# Patient Record
Sex: MALE | Race: WHITE | HISPANIC OR LATINO | URBAN - METROPOLITAN AREA
[De-identification: names, ages, dates, MRNs, and addresses within clinical notes are randomized per-mention and may not be internally consistent; named-entity substitution may affect disease eponyms.]

---

## 2018-01-31 ENCOUNTER — EMERGENCY (EMERGENCY)
Facility: HOSPITAL | Age: 53
LOS: 1 days | Discharge: HOME | End: 2018-01-31
Attending: EMERGENCY MEDICINE | Admitting: EMERGENCY MEDICINE
Payer: SELF-PAY

## 2018-01-31 VITALS
RESPIRATION RATE: 20 BRPM | HEIGHT: 65 IN | WEIGHT: 190.04 LBS | SYSTOLIC BLOOD PRESSURE: 120 MMHG | OXYGEN SATURATION: 98 % | DIASTOLIC BLOOD PRESSURE: 79 MMHG | TEMPERATURE: 98 F | HEART RATE: 86 BPM

## 2018-01-31 PROCEDURE — 99284 EMERGENCY DEPT VISIT MOD MDM: CPT

## 2018-01-31 PROCEDURE — 73562 X-RAY EXAM OF KNEE 3: CPT | Mod: 26,50

## 2018-01-31 PROCEDURE — 99284 EMERGENCY DEPT VISIT MOD MDM: CPT | Mod: 25

## 2018-01-31 PROCEDURE — 73562 X-RAY EXAM OF KNEE 3: CPT

## 2018-01-31 NOTE — ED ADULT TRIAGE NOTE - CHIEF COMPLAINT QUOTE
pt drives garbage truck for a living ,slipped in the snow on the street yesterday while at work and injured both his knees

## 2018-01-31 NOTE — ED PROVIDER NOTE - OBJECTIVE STATEMENT
53 y/o male states he slipped yesterday while at work, landed on both knees. Complaining of bilateral knee pain with touch and  certain movement. Able to bear weight and ambulate. Denies any other injuries or complaints.

## 2018-01-31 NOTE — ED PROVIDER NOTE - ATTENDING CONTRIBUTION TO CARE
I, Humza Chester, performed the initial face to face bedside interview with this patient regarding history of present illness, review of symptoms and relevant past medical, social and family history.  I completed an independent physical examination.  I was the initial provider who evaluated this patient. I have signed out the follow up of any pending tests (i.e. labs, radiological studies) to the ACP.  I have communicated the patient’s plan of care and disposition with the ACP.

## 2018-01-31 NOTE — ED PROVIDER NOTE - MUSCULOSKELETAL, MLM
Spine appears normal, range of motion is not limited, no muscle or joint tenderness, no swelling or abrasion noted to b.l knee, FROM

## 2018-01-31 NOTE — ED PROVIDER NOTE - PROGRESS NOTE DETAILS
results were reviewed and discussed with pt, advised to f.u with ortho, apply ice, take motrin/tylenol as needed. if any symptoms worsen or any new symptoms occur, return to ED

## 2018-03-16 ENCOUNTER — EMERGENCY (EMERGENCY)
Facility: HOSPITAL | Age: 53
LOS: 1 days | Discharge: HOME | End: 2018-03-16
Attending: EMERGENCY MEDICINE
Payer: SELF-PAY

## 2018-03-16 VITALS
RESPIRATION RATE: 16 BRPM | TEMPERATURE: 98 F | HEART RATE: 99 BPM | SYSTOLIC BLOOD PRESSURE: 125 MMHG | OXYGEN SATURATION: 94 % | DIASTOLIC BLOOD PRESSURE: 79 MMHG

## 2018-03-16 VITALS — WEIGHT: 199.96 LBS | HEIGHT: 65 IN

## 2018-03-16 PROCEDURE — 99282 EMERGENCY DEPT VISIT SF MDM: CPT | Mod: 25

## 2018-03-16 PROCEDURE — 12001 RPR S/N/AX/GEN/TRNK 2.5CM/<: CPT

## 2018-03-16 PROCEDURE — 90715 TDAP VACCINE 7 YRS/> IM: CPT

## 2018-03-16 PROCEDURE — 99283 EMERGENCY DEPT VISIT LOW MDM: CPT | Mod: 25

## 2018-03-16 PROCEDURE — 90471 IMMUNIZATION ADMIN: CPT

## 2018-03-16 RX ORDER — TETANUS TOXOID, REDUCED DIPHTHERIA TOXOID AND ACELLULAR PERTUSSIS VACCINE, ADSORBED 5; 2.5; 8; 8; 2.5 [IU]/.5ML; [IU]/.5ML; UG/.5ML; UG/.5ML; UG/.5ML
0.5 SUSPENSION INTRAMUSCULAR ONCE
Qty: 0 | Refills: 0 | Status: COMPLETED | OUTPATIENT
Start: 2018-03-16 | End: 2018-03-16

## 2018-03-16 RX ADMIN — TETANUS TOXOID, REDUCED DIPHTHERIA TOXOID AND ACELLULAR PERTUSSIS VACCINE, ADSORBED 0.5 MILLILITER(S): 5; 2.5; 8; 8; 2.5 SUSPENSION INTRAMUSCULAR at 18:25

## 2018-03-16 NOTE — ED PROVIDER NOTE - MUSCULOSKELETAL, MLM
Spine appears normal, range of motion is not limited, no muscle or joint tenderness. FROM at 3rd digit.

## 2018-03-16 NOTE — ED PROVIDER NOTE - OBJECTIVE STATEMENT
53 y/o M no PSHx or PMHx presents to ED c/o laceration to R 3rd finger onset today. Pt was lifting garbage bag with broke glass which cut his finger. Admits to pain and bleeding at sight. No numbness or weakness in finger. Pt is able to move his finger but with a lot of pain. Cannot recall last Tetanus shot. Would like to have one in ED. No further complaints at this time.

## 2018-03-16 NOTE — ED PROVIDER NOTE - CARE PLAN
Principal Discharge DX:	Laceration of right middle finger without foreign body without damage to nail, initial encounter  Assessment and plan of treatment:	F/u for suture removal in 7-10 days

## 2018-03-16 NOTE — ED PROVIDER NOTE - SKIN, MLM
2.5 cm laceration to mid finger in palmar aspect, flexor tendon intact, no foreign body. normal capillary refill.

## 2018-03-25 ENCOUNTER — EMERGENCY (EMERGENCY)
Facility: HOSPITAL | Age: 53
LOS: 1 days | Discharge: HOME | End: 2018-03-25
Attending: EMERGENCY MEDICINE | Admitting: EMERGENCY MEDICINE
Payer: SELF-PAY

## 2018-03-25 VITALS
DIASTOLIC BLOOD PRESSURE: 76 MMHG | TEMPERATURE: 98 F | OXYGEN SATURATION: 97 % | HEART RATE: 93 BPM | SYSTOLIC BLOOD PRESSURE: 118 MMHG | RESPIRATION RATE: 18 BRPM

## 2018-03-25 VITALS
DIASTOLIC BLOOD PRESSURE: 75 MMHG | RESPIRATION RATE: 17 BRPM | HEART RATE: 90 BPM | OXYGEN SATURATION: 99 % | SYSTOLIC BLOOD PRESSURE: 120 MMHG | TEMPERATURE: 98 F

## 2018-03-25 LAB
ANION GAP SERPL CALC-SCNC: 13 MMOL/L — SIGNIFICANT CHANGE UP (ref 5–17)
BUN SERPL-MCNC: 13 MG/DL — SIGNIFICANT CHANGE UP (ref 8–20)
CALCIUM SERPL-MCNC: 9.6 MG/DL — SIGNIFICANT CHANGE UP (ref 8.6–10.2)
CHLORIDE SERPL-SCNC: 101 MMOL/L — SIGNIFICANT CHANGE UP (ref 98–107)
CO2 SERPL-SCNC: 24 MMOL/L — SIGNIFICANT CHANGE UP (ref 22–29)
CREAT SERPL-MCNC: 0.61 MG/DL — SIGNIFICANT CHANGE UP (ref 0.5–1.3)
GLUCOSE SERPL-MCNC: 213 MG/DL — HIGH (ref 70–115)
HCT VFR BLD CALC: 41.7 % — LOW (ref 42–52)
HGB BLD-MCNC: 14 G/DL — SIGNIFICANT CHANGE UP (ref 14–18)
MCHC RBC-ENTMCNC: 29.7 PG — SIGNIFICANT CHANGE UP (ref 27–31)
MCHC RBC-ENTMCNC: 33.6 G/DL — SIGNIFICANT CHANGE UP (ref 32–36)
MCV RBC AUTO: 88.5 FL — SIGNIFICANT CHANGE UP (ref 80–94)
PLATELET # BLD AUTO: 243 K/UL — SIGNIFICANT CHANGE UP (ref 150–400)
POTASSIUM SERPL-MCNC: 3.9 MMOL/L — SIGNIFICANT CHANGE UP (ref 3.5–5.3)
POTASSIUM SERPL-SCNC: 3.9 MMOL/L — SIGNIFICANT CHANGE UP (ref 3.5–5.3)
RBC # BLD: 4.71 M/UL — SIGNIFICANT CHANGE UP (ref 4.6–6.2)
RBC # FLD: 13.5 % — SIGNIFICANT CHANGE UP (ref 11–15.6)
SODIUM SERPL-SCNC: 138 MMOL/L — SIGNIFICANT CHANGE UP (ref 135–145)
WBC # BLD: 8.6 K/UL — SIGNIFICANT CHANGE UP (ref 4.8–10.8)
WBC # FLD AUTO: 8.6 K/UL — SIGNIFICANT CHANGE UP (ref 4.8–10.8)

## 2018-03-25 PROCEDURE — 99284 EMERGENCY DEPT VISIT MOD MDM: CPT | Mod: 25

## 2018-03-25 PROCEDURE — 36415 COLL VENOUS BLD VENIPUNCTURE: CPT

## 2018-03-25 PROCEDURE — 87070 CULTURE OTHR SPECIMN AEROBIC: CPT

## 2018-03-25 PROCEDURE — 99284 EMERGENCY DEPT VISIT MOD MDM: CPT

## 2018-03-25 PROCEDURE — T1013: CPT

## 2018-03-25 PROCEDURE — 85027 COMPLETE CBC AUTOMATED: CPT

## 2018-03-25 PROCEDURE — 80048 BASIC METABOLIC PNL TOTAL CA: CPT

## 2018-03-25 PROCEDURE — 96374 THER/PROPH/DIAG INJ IV PUSH: CPT

## 2018-03-25 RX ORDER — SODIUM CHLORIDE 9 MG/ML
3 INJECTION INTRAMUSCULAR; INTRAVENOUS; SUBCUTANEOUS ONCE
Qty: 0 | Refills: 0 | Status: COMPLETED | OUTPATIENT
Start: 2018-03-25 | End: 2018-03-25

## 2018-03-25 RX ADMIN — Medication 100 MILLIGRAM(S): at 16:53

## 2018-03-25 RX ADMIN — SODIUM CHLORIDE 3 MILLILITER(S): 9 INJECTION INTRAMUSCULAR; INTRAVENOUS; SUBCUTANEOUS at 16:53

## 2018-03-25 NOTE — ED ADULT TRIAGE NOTE - CHIEF COMPLAINT QUOTE
"I was seen here 9 days ago for stitches and I need to have them removed. " Pt has stitches in the right middle finger.

## 2018-03-25 NOTE — ED STATDOCS - MEDICAL DECISION MAKING DETAILS
Pt with DM presents with infected finger. Will do labs, and give abx. Pt with DM presents with infected finger. Will do labs, and give abx.  FINGER INFECTION DIABETIC NO FEVER NO WHITE COUNT  PLAN ORAL AB AND PMD F/U

## 2018-03-25 NOTE — ED STATDOCS - OBJECTIVE STATEMENT
51 y/o M pt with a hx of DM presents to the ED with c/o stich removal from the rt third finger on volar surface. Pt had a laceration stitched 10 days ago. He states that 2 days ago he noted pus coming out from the wound but he "cleaned it and left it alone". Pt admits that finger is swollen and red. Denies fevers, chills, pain to finger. No further complaints at this time.

## 2018-03-25 NOTE — ED ADULT NURSE NOTE - OBJECTIVE STATEMENT
53 y/o male presents to ed for suture removal right 3rd finger placed 1 week ago here at [facility]

## 2018-03-25 NOTE — ED STATDOCS - SKIN, MLM
sutured laceration to rt third digit, erythematous, warm, with scant serosanguinous liquid expressed from pt

## 2018-03-27 LAB
CULTURE RESULTS: SIGNIFICANT CHANGE UP
SPECIMEN SOURCE: SIGNIFICANT CHANGE UP

## 2019-04-02 NOTE — ED PROVIDER NOTE - DIAGNOSIS COUNSELING, MDM
conducted a detailed discussion... I had a detailed discussion with the patient and/or guardian regarding the historical points, exam findings, and any diagnostic results supporting the discharge/admit diagnosis. DISPLAY PLAN FREE TEXT DISPLAY PLAN FREE TEXT DISPLAY PLAN FREE TEXT

## 2019-07-24 NOTE — ED PROVIDER NOTE - NS ED MD DISPO DISCHARGE
Put in mail a letter of results of TSH from 7/22/2019 to patient. Sent at 3:30 PM on July 24, 2019.  
Home

## 2023-06-01 NOTE — ED ADULT TRIAGE NOTE - ARRIVAL FROM
Home - Tested pos for COVID19 on admission  - C/w BiPAP  - F/u daily VBGs  - C/w decadron - Tested pos for COVID19 on admission  - C/w CPAP  - F/u daily VBGs  - C/w decadron
